# Patient Record
Sex: MALE | Race: WHITE | Employment: OTHER | URBAN - METROPOLITAN AREA
[De-identification: names, ages, dates, MRNs, and addresses within clinical notes are randomized per-mention and may not be internally consistent; named-entity substitution may affect disease eponyms.]

---

## 2017-05-31 ENCOUNTER — AMBULATORY - HEALTHEAST (OUTPATIENT)
Dept: NEUROSURGERY | Facility: CLINIC | Age: 62
End: 2017-05-31

## 2017-05-31 ENCOUNTER — RECORDS - HEALTHEAST (OUTPATIENT)
Dept: ADMINISTRATIVE | Facility: OTHER | Age: 62
End: 2017-05-31

## 2017-05-31 ENCOUNTER — OFFICE VISIT - HEALTHEAST (OUTPATIENT)
Dept: NEUROSURGERY | Facility: CLINIC | Age: 62
End: 2017-05-31

## 2017-05-31 DIAGNOSIS — R29.898 LEG WEAKNESS, BILATERAL: ICD-10-CM

## 2017-05-31 DIAGNOSIS — M51.16 LUMBAR DISC HERNIATION WITH RADICULOPATHY: ICD-10-CM

## 2017-05-31 ASSESSMENT — MIFFLIN-ST. JEOR
SCORE: 1791.44
SCORE: 1807.77

## 2017-06-01 ENCOUNTER — ANESTHESIA - HEALTHEAST (OUTPATIENT)
Dept: SURGERY | Facility: CLINIC | Age: 62
End: 2017-06-01

## 2017-06-01 ENCOUNTER — SURGERY - HEALTHEAST (OUTPATIENT)
Dept: SURGERY | Facility: CLINIC | Age: 62
End: 2017-06-01

## 2017-06-05 ENCOUNTER — COMMUNICATION - HEALTHEAST (OUTPATIENT)
Dept: NEUROSURGERY | Facility: CLINIC | Age: 62
End: 2017-06-05

## 2017-06-07 ENCOUNTER — COMMUNICATION - HEALTHEAST (OUTPATIENT)
Dept: NEUROSURGERY | Facility: CLINIC | Age: 62
End: 2017-06-07

## 2017-06-07 DIAGNOSIS — M54.9 BACK PAIN: ICD-10-CM

## 2017-06-15 ENCOUNTER — AMBULATORY - HEALTHEAST (OUTPATIENT)
Dept: NEUROSURGERY | Facility: CLINIC | Age: 62
End: 2017-06-15

## 2017-06-16 ENCOUNTER — AMBULATORY - HEALTHEAST (OUTPATIENT)
Dept: NEUROSURGERY | Facility: CLINIC | Age: 62
End: 2017-06-16

## 2017-06-16 DIAGNOSIS — Z48.02 REMOVAL OF STAPLES: ICD-10-CM

## 2017-06-20 ENCOUNTER — AMBULATORY - HEALTHEAST (OUTPATIENT)
Dept: NEUROSURGERY | Facility: CLINIC | Age: 62
End: 2017-06-20

## 2017-07-14 ENCOUNTER — OFFICE VISIT - HEALTHEAST (OUTPATIENT)
Dept: NEUROSURGERY | Facility: CLINIC | Age: 62
End: 2017-07-14

## 2017-07-14 DIAGNOSIS — M51.16 LUMBAR DISC HERNIATION WITH RADICULOPATHY: ICD-10-CM

## 2017-07-14 ASSESSMENT — MIFFLIN-ST. JEOR: SCORE: 1789.63

## 2017-12-18 ENCOUNTER — RECORDS - HEALTHEAST (OUTPATIENT)
Dept: ADMINISTRATIVE | Facility: OTHER | Age: 62
End: 2017-12-18

## 2017-12-20 ENCOUNTER — RECORDS - HEALTHEAST (OUTPATIENT)
Dept: RADIOLOGY | Facility: CLINIC | Age: 62
End: 2017-12-20

## 2017-12-20 ENCOUNTER — HOSPITAL ENCOUNTER (OUTPATIENT)
Dept: RADIOLOGY | Facility: CLINIC | Age: 62
Discharge: HOME | End: 2017-12-20
Attending: NEUROLOGICAL SURGERY

## 2017-12-20 ENCOUNTER — OFFICE VISIT - HEALTHEAST (OUTPATIENT)
Dept: NEUROSURGERY | Facility: CLINIC | Age: 62
End: 2017-12-20

## 2017-12-20 DIAGNOSIS — M54.9 BACK PAIN: ICD-10-CM

## 2017-12-20 ASSESSMENT — MIFFLIN-ST. JEOR: SCORE: 1789.63

## 2018-01-08 ENCOUNTER — COMMUNICATION - HEALTHEAST (OUTPATIENT)
Dept: NEUROSURGERY | Facility: CLINIC | Age: 63
End: 2018-01-08

## 2018-01-16 ENCOUNTER — COMMUNICATION - HEALTHEAST (OUTPATIENT)
Dept: NEUROSURGERY | Facility: CLINIC | Age: 63
End: 2018-01-16

## 2018-01-16 DIAGNOSIS — M54.9 BACK PAIN: ICD-10-CM

## 2018-01-17 ENCOUNTER — AMBULATORY - HEALTHEAST (OUTPATIENT)
Dept: NEUROSURGERY | Facility: CLINIC | Age: 63
End: 2018-01-17

## 2018-02-21 ENCOUNTER — HOSPITAL ENCOUNTER (OUTPATIENT)
Dept: SPEECH THERAPY | Facility: CLINIC | Age: 63
Setting detail: THERAPIES SERIES
End: 2018-02-21
Attending: PSYCHIATRY & NEUROLOGY
Payer: MEDICARE

## 2018-02-21 PROCEDURE — 92522 EVALUATE SPEECH PRODUCTION: CPT | Mod: GN | Performed by: SPEECH-LANGUAGE PATHOLOGIST

## 2018-02-21 PROCEDURE — G9175 SPEECH LANG GOAL STATUS: HCPCS | Mod: GN,CL | Performed by: SPEECH-LANGUAGE PATHOLOGIST

## 2018-02-21 PROCEDURE — G9174 SPEECH LANG CURRENT STATUS: HCPCS | Mod: GN,CK | Performed by: SPEECH-LANGUAGE PATHOLOGIST

## 2018-02-21 PROCEDURE — 92507 TX SP LANG VOICE COMM INDIV: CPT | Mod: GN | Performed by: SPEECH-LANGUAGE PATHOLOGIST

## 2018-02-21 PROCEDURE — 40000230 ZZH STATISTIC SPEECH FACIAL PARALYSIS VISIT: Performed by: SPEECH-LANGUAGE PATHOLOGIST

## 2018-02-28 NOTE — ADDENDUM NOTE
Encounter addended by: Christiane Chow SLP on: 2/27/2018  9:02 PM<BR>     Actions taken: Flowsheet accepted

## 2018-02-28 NOTE — PROGRESS NOTES
[facility]          OUTPATIENT SPEECH LANGUAGE PATHOLOGY FACIAL PARALYSIS EVALUATION  PLAN OF TREATMENT FOR OUTPATIENT REHABILITATION  (COMPLETE FOR INITIAL CLAIMS ONLY)  Patient's Last Name, First Name, M.I.  YOB: 1955  Haroon Villafana                        Provider s Name: [facility] Medical Record No.  [id number]     Onset Date: 01/26/18    Start of Care Date: 02/21/18   Type:     ___PT  ___OT   _X_SLP    Medical Diagnosis:  Speech Disturbance   Speech Language Pathology Diagnosis:  Dysarthria, Oral Phase Dysphagia (Informal assessment), Facial Weakness    Visits from SOC: 1    _________________________________________________________________________________  Plan of Treatment/Functional Goals:   Planned Therapy Interventions: Facial Therapy, Improve Facial Tone and Function, Improve Speech Intelligibilty           Goals   1. Goal Identifier: Oral Phase Swallowing       Goal Description: Request physician referral for Clinical Swallow Evaluation and complete/add treatment goals as indicated.         Target Date: 04/27/18   2. Goal Identifier: Speech       Goal Description: Patient will report a 25% improvement in ease of speech production.       Target Date: 04/27/18   3. Goal Identifier: Nonverbal Communication, Orofacial Function for Speech and Oral Stage Swallowing       Goal Description: Increase Facial Grading Score 10 points per therapist judegement reflecting gains in orofacial tone and movement for speech, nonverbal communication and oral phase swallowing.        Target Date: 04/27/18                           Christiane Chow, SLP       I CERTIFY THE NEED FOR THESE SERVICES FURNISHED UNDER        THIS PLAN OF TREATMENT AND WHILE UNDER MY CARE .             Physician Signature               Date    X_____________________________________________________             "            Certification Date From:   02/21/18  Certification Date To:  05/18/18             Referring Physician:  Janette Lance MD     Initial Assessment        See Epic Evaluation Start of Care Date: 02/21/18                     Speech Pathology/Facial Paralysis Evaluation - 02/21/18 1300   Visit Type   Visit Type Initial   Patient Type   Patient Type Adult   General Patient Information   Start Of Care Date 02/21/18   Referring Physician Janette Lance MD    Orders Eval And Treat   Orders Date 01/26/18   Medical Diagnosis Speech Disturbance   Onset Of Illness/injury Or Date Of Surgery 01/26/18   Surgical/Medical History Reviewed Yes  (Per patient, medical record unavailable at time of evaluation.)   Pertinent History Of Current Problem Per patient, \"I woke up almost a year ago, and the whole side of my face was drooping (4/3/17), went to the ER, they told me I had Bell's Palsy, went twice the face hurt so much, it also happened when I was 20 on the same (left) side and it went away completely. My eye finally started to blink a couple months later, I can't shave right, can't hold air in my cheek, my eye closes whenever I eat, it fonseca sometimes, if I put any food or drink on the left it shoots out the left side, so I pretty much chew on the right side all the time. I'm looking for exercises to do at home. My brother had this, too. \"  (Wife reports facial tone changes from day to day.)   General Health Diabetes;HBP;Surgery;Smoker  (Back pain)   Diagnostic Tests CT scan   Occupation Retired    Sensory Changes None   Pain Description None   Hearing Changes None   Eye Problems Excessive tearing   Oral Habits Unilateral chewing   Patient's Concerns tearing;difficulty drinking ;difficulty eating;other (comment)  (difficulty holding air in cheeks, food/liqid spill, tightnes)   Patient/Family Goals Improve above   Evaluation Results: Resting Tone and Symmetry/Oral status   Palpebral Fissure - Type of " Eye Tone  Narrow  (3 mm)   Nasolabial Fold  More Pronounced   Lips  - Type of Lip Tone  Normal   Chin  - Type of Chin Tone  [place]ingham   Type of Forehead Wrinkles Less   Type of Eye Wrinkles Less   Type of Cheek/Mouth Wrinkles More   Oral Rest Posture Inferior, dentalized   Evaluation Results: Forehead Elevation   Forehead Strength Rating % 20%   Forehead - Synkinesis  Orbicularis Occuli;Zygomaticus   Forehead General Severity of Synkinesis   mild   Evaluation Results: Minimal Effort Eye Closure    Complete Yes   Strength Rating % Reduced   Eye Closure Synkinesis   Levators;Zygomaticus;Platysma   General Severity of Synkinesis   (Slight)   Evaluation Results: Open Mouth Smile    Open Smile Strength Rating % 25%   Open Smile Synkinesis   Orbicularis Occuli;Mentalis   Open Smile General Severity of Synkinesis   mild   Evaluation Results: Closed Mouth Smile    Closed Mouth Smile Strength Rating % 50%   Closed Mouth Smile Synkinesis   Orbicularis Occuli;Mentalis   Closed Mouth Smile General Severity of Synkinesis   mild   Evaluation Results: Snarl   Snarl Strength Rating % 70%   Snarl Synkinesis  Orbicularis Occuli;Zygomaticus;Mentalis;Platysma   Snarl General Severity of Synkinesis   severe    Evaluation Results: Pucker   Strength Rating % 40%   Pucker Synkinesis   Orbicularis Occuli;Zygomaticus;Mentalis;Platysma   General Severity of Synkinesis   moderate to severe   Evaluation Results: Compression   Strength Rating % 50%   Compression Synkinesis   Orbicularis Occuli   General Severity of Synkinesis   (Slight)   Evaluation Results: Contraction   Strength Rating % 75%   Contraction Synkinesis   Orbicularis Occuli;Zygomaticus;Mentalis;Platysma   General Severity of Synkinesis   moderate to severe   Evaluation Results: Protrusion   Strength Rating % 40%   Protrusion Synkinesis   Orbicularis Occuli;Zygomaticus;Mentalis   General Severity of Synkinesis   moderate   Evaluation Results: Pout   Strength Rating % 60%   Pout  Synkinesis   Orbicularis Occuli;Zygomaticus;Mentalis   General Severity of Synkinesis   moderate to severe   Evaluation Results: Lower Lip Depression   Strength Rating % 80%   Lower Lip Depression Synkinesis   Orbicularis Occuli;Zygomaticus;Mentalis   General Severity of Synkinesis   moderate to severe   Evaluation Results: Tongue Movement   Tongue Protrusion WNL   Elevation/Depression Extraorally (WNL)   Elevation/Depression Intraorally Deviates to left on elevation   Presence of Synkinesis with Lingual Movements Orbicularis oculi - moderate to severel   Evaluation Results: Speech Function   Evaluation Results: Speech Function Reduced stability necessary for refined speech;Deviation of lips during speech to right, stronger side;Reduced lip movement on the left;Impaired labial sounds, (e.g. p, b, m, f, v)   Synkinesis with Sound-Lip Rounding Orbicularis Occuli   General Severity of Synkinesis with Sound-Lip Rounding Mild to moderate   Synkinesis with Sound-Lip Pressure Orbicularis Occuli;Zygomaticus   General Severity of Synkinesis with Sound-Lip Pressure mild to moderate   General Therapy Interventions   Planned Therapy Interventions Facial Therapy;Improve Facial Tone and Function;Improve Speech Intelligibilty   Clinical Impressions   Criteria for Skilled Therapeutic Interventions Met yes;treatment indicated   Facial Grading Score 42.75/100   Communication Diagnosis Dysarthria   Swallowing Diagnosis Oral Phase Dysphagia  (Informal assessment)   Rehab Potential good to achieve stated therapy goal(s)   Therapy Frequency  (1 x/1-2 months)   Predicted Duration of Therapy Intervention (days/weeks) 12 months   Risks and Benefits of Treatment have been explained yes   Patient, family and/or staff in agreement yes   Facial Paralysis Goals   Facial Paralysis Goals 1;2;3   Facial Paralysis Goal 1   Goal Identifier Oral Phase Swallowing   Goal Description Request physician referral for Clinical Swallow Evaluation and  complete/add treatment goals as indicated.     Target Date 04/27/18   Facial Paralysis Goal 2   Goal Identifier Speech   Goal Description Patient will report a 25% improvement in ease of speech production.   Target Date 04/27/18   Facial Paralysis Goal 3   Goal Identifier Nonverbal Communication, Orofacial Function for Speech and Oral Stage Swallowing   Goal Description Increase Facial Grading Score 10 points per therapist judegement reflecting gains in orofacial tone and movement for speech, nonverbal communication and oral phase swallowing.    Target Date 04/27/18   Education Assessment   Preferred Learning Style Listening;Reading;Demonstration;Pictures/video   Total Evaluation Time   Total Evaluation Time 45

## 2018-02-28 NOTE — ADDENDUM NOTE
Encounter addended by: Christiane Chow, SLP on: 2/27/2018  8:56 PM<BR>     Actions taken: Sign clinical note, Flowsheet accepted, Pend clinical note, Document created

## 2018-02-28 NOTE — ADDENDUM NOTE
Encounter addended by: Christiane Chow SLP on: 2/28/2018  5:12 PM<BR>     Actions taken: Sign clinical note

## 2018-03-19 NOTE — ADDENDUM NOTE
Encounter addended by: Christiane Chow, SLP on: 3/19/2018  4:21 PM<BR>     Actions taken: Flowsheet accepted, Charge Capture section accepted

## 2019-01-10 ENCOUNTER — RECORDS - HEALTHEAST (OUTPATIENT)
Dept: ADMINISTRATIVE | Facility: OTHER | Age: 64
End: 2019-01-10

## 2019-02-08 ENCOUNTER — RECORDS - HEALTHEAST (OUTPATIENT)
Dept: ADMINISTRATIVE | Facility: OTHER | Age: 64
End: 2019-02-08

## 2019-02-21 ENCOUNTER — RECORDS - HEALTHEAST (OUTPATIENT)
Dept: ADMINISTRATIVE | Facility: OTHER | Age: 64
End: 2019-02-21

## 2019-03-08 ENCOUNTER — RECORDS - HEALTHEAST (OUTPATIENT)
Dept: ADMINISTRATIVE | Facility: OTHER | Age: 64
End: 2019-03-08

## 2019-03-13 ENCOUNTER — RECORDS - HEALTHEAST (OUTPATIENT)
Dept: ADMINISTRATIVE | Facility: OTHER | Age: 64
End: 2019-03-13

## 2019-04-12 ENCOUNTER — RECORDS - HEALTHEAST (OUTPATIENT)
Dept: ADMINISTRATIVE | Facility: OTHER | Age: 64
End: 2019-04-12

## 2019-04-15 ENCOUNTER — RECORDS - HEALTHEAST (OUTPATIENT)
Dept: ADMINISTRATIVE | Facility: OTHER | Age: 64
End: 2019-04-15

## 2019-04-30 ENCOUNTER — RECORDS - HEALTHEAST (OUTPATIENT)
Dept: ADMINISTRATIVE | Facility: OTHER | Age: 64
End: 2019-04-30

## 2019-05-15 ENCOUNTER — RECORDS - HEALTHEAST (OUTPATIENT)
Dept: ADMINISTRATIVE | Facility: OTHER | Age: 64
End: 2019-05-15

## 2019-06-19 ENCOUNTER — RECORDS - HEALTHEAST (OUTPATIENT)
Dept: ADMINISTRATIVE | Facility: OTHER | Age: 64
End: 2019-06-19

## 2019-10-14 ENCOUNTER — RECORDS - HEALTHEAST (OUTPATIENT)
Dept: ADMINISTRATIVE | Facility: OTHER | Age: 64
End: 2019-10-14

## 2019-11-13 ENCOUNTER — AMBULATORY - HEALTHEAST (OUTPATIENT)
Dept: NEUROSURGERY | Facility: CLINIC | Age: 64
End: 2019-11-13

## 2019-11-13 DIAGNOSIS — M54.16 LUMBAR RADICULOPATHY: ICD-10-CM

## 2019-11-25 ENCOUNTER — RECORDS - HEALTHEAST (OUTPATIENT)
Dept: ADMINISTRATIVE | Facility: OTHER | Age: 64
End: 2019-11-25

## 2019-12-02 ENCOUNTER — RECORDS - HEALTHEAST (OUTPATIENT)
Dept: ADMINISTRATIVE | Facility: OTHER | Age: 64
End: 2019-12-02

## 2019-12-02 ENCOUNTER — COMMUNICATION - HEALTHEAST (OUTPATIENT)
Dept: NEUROSURGERY | Facility: CLINIC | Age: 64
End: 2019-12-02

## 2019-12-09 ENCOUNTER — OFFICE VISIT - HEALTHEAST (OUTPATIENT)
Dept: NEUROSURGERY | Facility: CLINIC | Age: 64
End: 2019-12-09

## 2019-12-09 DIAGNOSIS — R52 PAIN: ICD-10-CM

## 2019-12-09 ASSESSMENT — MIFFLIN-ST. JEOR: SCORE: 1724.32

## 2021-05-31 VITALS — HEIGHT: 68 IN | WEIGHT: 226 LBS | BODY MASS INDEX: 34.25 KG/M2

## 2021-05-31 VITALS — BODY MASS INDEX: 34.86 KG/M2 | WEIGHT: 230 LBS | HEIGHT: 68 IN

## 2021-05-31 VITALS — HEIGHT: 68 IN | BODY MASS INDEX: 34.25 KG/M2 | WEIGHT: 226 LBS

## 2021-05-31 VITALS — WEIGHT: 226.4 LBS | BODY MASS INDEX: 34.31 KG/M2 | HEIGHT: 68 IN | BODY MASS INDEX: 34.42 KG/M2 | WEIGHT: 226.4 LBS

## 2021-05-31 VITALS — HEIGHT: 68 IN | BODY MASS INDEX: 34.97 KG/M2

## 2021-06-03 NOTE — TELEPHONE ENCOUNTER
Called patient to inform him to bring a disk of his imaging as I was unable to push to our  per [facility] as it was not done with them.

## 2021-06-04 VITALS
OXYGEN SATURATION: 93 % | BODY MASS INDEX: 30.82 KG/M2 | HEART RATE: 60 BPM | WEIGHT: 208.1 LBS | SYSTOLIC BLOOD PRESSURE: 183 MMHG | HEIGHT: 69 IN | DIASTOLIC BLOOD PRESSURE: 91 MMHG

## 2021-06-04 NOTE — PROGRESS NOTES
The patient is a 64-year-old male.  He had previous back surgery by Dr. Arroyo.  He now complains of back pain.  No radicular pain.  I do not see anything particularly bad on MRI.  I showed him the MRI pictures.  He does have some neural foramen narrowing but no radicular pain.  He does have some bulging disks, not bad.  He does have degenerative changes.  I mentioned the possibility of a fusion to him.  He would need to stop smoking.  He says that is not going to happen.  Plan a consult at the spine center to go over nonsurgical treatment.  Total time 20 minutes, more than 50% spent counseling and/or coordinating care.

## 2021-06-04 NOTE — PROGRESS NOTES
Neurosurgery consultation was requested by: Former Dr. Arroyo pt.   Pain: Middle of low back pain, constant   Radicular Pain is present: Denies radicular pain   Lhermitte sign: no   Motor complaints: Denies weakness  Sensory complaints: Denies numbness and tingling  Gait and balance issues: no   Bowel or bladder issues: no  Duration of SX is: 2 and half years   The symptoms are worse with: sitting and walking   The symptoms are better with: nothing  Injury: Denies   Severity is: chronic   Patient has tried the following conservative measures: He has done injections and PT in the past and did not help.   Pt states pain started after a  L1-2 and L3-4 decompressive laminectomy on 6/1/17 by Dr. Arroyo.   CAROLYN score is:  62%  ARIANA Salmon

## 2021-06-11 NOTE — ANESTHESIA POSTPROCEDURE EVALUATION
Patient: Haroon Villafana  L1-2 and L3-4 decompressive laminectomy  Anesthesia type: general    Patient location: PACU  Last vitals:   Vitals:    06/01/17 1515   BP: 139/69   Pulse: 66   Resp: 12   Temp:    SpO2: 98%     Post vital signs: stable  Level of consciousness: awake and responds to simple questions  Post-anesthesia pain: pain controlled  Post-anesthesia nausea and vomiting: no  Pulmonary: unassisted, return to baseline  Cardiovascular: stable and blood pressure at baseline  Hydration: adequate  Anesthetic events: no    QCDR Measures:  ASA# 11 - Nicky-op Cardiac Arrest: ASA11B - Patient did NOT experience unanticipated cardiac arrest  ASA# 12 - Nicky-op Mortality Rate: ASA12B - Patient did NOT die  ASA# 13 - PACU Re-Intubation Rate: ASA13B - Patient did NOT require a new airway mgmt  ASA# 10 - Composite Anes Safety: ASA10A - No serious adverse event  ASA# 38 - New Corneal Injury: ASA38A - No new exposure keratitis or corneal abrasion in PACU    Additional Notes:

## 2021-06-11 NOTE — PROGRESS NOTES
NEUROSURGERY CONSULTATION NOTE    Haroon Villafana   [id number] [address] [facility] [id number]  61 y.o. male is sent us in consultation  From [facility] ED for c/o back and BLE pain with associated BLE weakness.     CONSULTATION ASSESSMENT AND PLAN:  L12  Mod stenosis, L34 disk herniation with severe central spinal canal stenosis, BLE weakness.  Admit to [facility] and plan to OR tomorrow for decompressive laminectomy by Dr. Arroyo. She will try to see patient this evening. Admitting MD,  Dr. Patel, much appreciated.        ADDITIONAL COMMENTS:  I discussed the patient's exam and imaging with Dr. Arroyo.      HPI:  Presented to  ED this am for c/o  acute onset severe low back pain that began 4-5 days ago (see visit note). He denies any acute injury, no recall of precipitating event.  Pain starts mid low back and radiates  into both legs posteriorly down to the ankles.   Pain is so severe at times that he has fallen to ground.  Describes pain as 80% legs, 20% back.  Reports tingling in both legs in same distribution, w/ bilateral leg weakness. Pain is better with sitting, worse with standing or walking. Bending forward takes some of the pressure off of his legs.   He has had no change in bowel or bladder symptoms, no incontinence. Has had similar episodes in past year but not this severe.  Diagnosed with Bell's Palsy in April, L facial weakness persists.  On ASA, was taking Ibuprofen but not for past 2 weeks.  Given Toradol in ED this am.  ED consulted with neurosurgery and we recommended transfer to [facility] for urgent surgery but patient refused as he had to transport his RV back to his home in [place], [place]. Agreed to return for afternoon clinic appt.       PROBLEM LIST:  Patient Active Problem List    Diagnosis Date Noted     Complete Right Bundle Branch Block      Type 2 Diabetes Mellitus      Hyperlipidemia      Major Depression, Single Episode      Peripheral Neuropathy      Hypertension  "     Lumbosacral Root Lesions      Massive Tear Of Rotator Cuff Tendon      Chronic Pain Syndrome      Joint Pain, Localized In The Shoulder      Bulging Cervical Disc      Bulging Lumbar Disc      Cervicalgia      Lumbar Radiculopathy      Insomnia         No past medical history on file.    Past Surgical History:   Procedure Laterality Date     IA LAP,INGUINAL HERNIA REPR,INITIAL      Description: Laparoscopy Repair Of Initial Inguinal Hernia;  Recorded: 07/13/2011;       REVIEW OF SYSTEMS:  12 point review of symptoms:  Bell's palsy dx April 2017--short course steroids, DM controlled on insulin, otherwise neg xc as in  HPI.       MEDICATIONS:  No current outpatient prescriptions on file.     No current facility-administered medications for this visit.        ALLERGIES/SENSITIVITIES:     No Known Allergies    PERTINENT SOCIAL HISTORY:   Social History     Social History     Marital status:      Spouse name: N/A     Number of children: N/A     Years of education: N/A     Social History Main Topics     Smoking status: Not on file     Smokeless tobacco: Not on file     Alcohol use Not on file     Drug use: Not on file     Sexual activity: Not on file     Other Topics Concern     Not on file     Social History Narrative     No narrative on file         FAMILY HISTORY:  No family history on file.     VS:    /72  Pulse 68  Resp 16  Ht 5' 8\" (1.727 m)  Wt (!) 230 lb (104.3 kg)  BMI 34.97 kg/m2    PHYSICAL EXAM:   Constitutional: Obese, Odor of cigarettes     Mental Status: A & O in mild distress.  Affect is appropriate.  Speech is fluent.  Recent and remote memory are intact.  Attention span and concentration are normal.     Cranial Nerves: CN1: grossly intact per patient recall. CN2: No funduscopic exam performed. CN3,4 & 6: Pupillary light response, lateral and vertical gaze normal.  No nystagmus.  Visual fields are full to confrontation. CN5: Intact to touch CN7:L facial weakness, L facial droop, " Difficulty closing L eye (Bell's Palsy)  CN8: Intact to spoken voice. CN9&10: Gag reflex, uvula midline, palate rises with phonation. CN11: Shoulder shrug 5/5 intact bilaterally. CN12: Tongue midline and moves freely from side to side.     Motor: No pronator drift of upper extremity. Normal bulk and tone all muscle groups of upper extremities. Lower extremities with normal bulk and tone but cannot heel/toe walk or do any maneuvers due to severe pain.     Strength:   Arm strength bilateral grasp, biceps, triceps, and deltoids 5/5   Leg strength bilateral dorsiflexion, plantar flexion, and hip flexion 2-3/5      Sensory: Sensation intact bilaterally to light touch and pin prick     Coordination; finger to nose,  smooth and rhythmic. Romberg intact. Heel/toe/tandem gait not attempted.  Gait and station severely antalgic and unsteady.      Reflexes; supinator, biceps, triceps, knee/ ankle jerk intact. No hoffmans/babinski/ clonus--Hyporeflexic, DM.     SLR positive bilaterally at about 30 degrees.     IMAGING:     MRI: 1. There is transitional anatomy at the lumbosacral junction. This report considers L5 the transitional body that is partially sacralized on the left. Careful correlation between the MRI and radiographs required to ensure proper level localization prior to surgery or any other intervention. 2.  Numbering in this manner there is a disc herniation at L3-L4 that contributes to relatively severe central spinal canal stenosis. Disc osteophyte complex and disc bulge contact the exiting nerve on the left and right respectively. 3.  At L1-L2 disc bulge contributes to moderate to severe spinal canal stenosis. 4.  At L4-L5 there is severe right and moderate left facet arthropathy and mild disc degeneration that results in mild lateral recess and at least moderate right worse than left bilateral foraminal stenoses. 5.  A 4.2 cm infrarenal abdominal aortic aneurysm.

## 2021-06-11 NOTE — ANESTHESIA PREPROCEDURE EVALUATION
Anesthesia Evaluation      Patient summary reviewed   No history of anesthetic complications     Airway   Mallampati: II  Neck ROM: full   Pulmonary - normal exam    breath sounds clear to auscultation  (+) a smoker  (-) COPD, asthma, sleep apnea                         Cardiovascular   Exercise tolerance: > or = 4 METS (Prior to back pain)  (+) hypertension, dysrhythmias, , hypercholesterolemia,     (-) murmur  ECG reviewed (RBBB)  Rhythm: regular  Rate: normal,    no murmur      Neuro/Psych    (+) neuromuscular disease,  depression, chronic pain    Comments: Walford Palsy    Endo/Other    (+) diabetes mellitus type 2 using insulin, obesity,   (-) hypothyroidism     GI/Hepatic/Renal - negative ROS   (-) GERD     Other findings: Few remaining teeth on bottom intact      Dental    (+) poor dentition                       Anesthesia Plan  Planned anesthetic: general endotracheal    ASA 3   Induction: intravenous   Anesthetic plan and risks discussed with: patient and spouse  Anesthesia plan special considerations: antiemetics,   Post-op plan: routine recovery

## 2021-06-11 NOTE — ANESTHESIA CARE TRANSFER NOTE
Last vitals:   Vitals:    06/01/17 1455   BP: 126/71   Pulse: 68   Resp: 12   Temp: 36.5  C (97.7  F)   SpO2: 100%     Patient's level of consciousness is drowsy  Spontaneous respirations: yes  Maintains airway independently: yes  Dentition unchanged: yes  Oropharynx: oropharynx clear of all foreign objects    QCDR Measures:  ASA# 20 - Surgical Safety Checklist: ASA20A - Safety Checks Done  PQRS# 430 - Adult PONV Prevention: 4558F - Pt received => 2 anti-emetic agents (different classes) preop & intraop  ASA# 8 - Peds PONV Prevention: NA - Not pediatric patient, not GA or 2 or more risk factors NOT present  PQRS# 424 - Nicky-op Temp Management: 4559F - At least one body temp DOCUMENTED => 35.5C or 95.9F within required timeframe  PQRS# 426 - PACU Transfer Protocol: - Transfer of care checklist used  ASA# 14 - Acute Post-op Pain: Patient did not experience pain >7  Breathing spontaneously with adequate tidal volume, patient opens eyes to name, oral pharynx suctioned, and ETT removed with good air exchange.

## 2021-06-11 NOTE — PROGRESS NOTES
CHART NOTE     DATE OF SERVICE:   2017    : 1955   62 y.o.     ASSESSMENT :   Doing well with improvement in symptoms and function.       PLAN:  Loosen restrictions. Refer to PT. RTC prn. Enc to call with any new questions or concerns.     HPI:  Haroon Villafana is status post L1- and L3-4 decompressive lumbar laminectomy, medial facetectomy, foramenotomy on 2017 by Dr. Arroyo. Preoperatively presented with bilateral leg weakness, numbness and pain.     TODAY, Haroon Villafana reports leg pain is gone. Still feels like he has a knot in his back.  Walking improved.  No residual N/T.  Retired.       PAST MEDICAL HISTORY, SURGICAL HISTORY, REVIEW OF SYMPTOMS, MEDICATIONS AND ALLERGIES:  Past medical history, surgical history, review of symptoms, medications and allergies remain unchanged.    Past Medical History:   Diagnosis Date     Diabetes mellitus      Essential hypertension      Hyperlipidemia      Left-sided Bell's palsy      Lumbar spinal stenosis        PHYSICAL EXAM:      Neurological exam reveals:  Recent and remote memory intact, fund of knowledge wnl.    Alert and oriented x3, speech fluent and appropriate.   PERRL, EOMI, No nystagmus, Face symmetric, tongue midline, Shoulder shrug equal  Leg strength bilateral dorsiflexion, plantar flexion, and hip flexion 5/5  Can heel/toe walk, do toe rises and squats without difficulty.   Muscle Bulk and tone wnl.   Reflexes: No pathological reflexes   Gait and station:Normal  Incision: CDI without erythema or edema  NDI/CAROLYN: 62%     RADIOGRAPHIC IMAGING:  NA

## 2021-06-11 NOTE — PROGRESS NOTES
"Haroon Villafana is status post L1- and L3-4 decompressive lumbar laminectomy, medial facetectomy, foramenotomy on 6/1/2017 by Dr. Arroyo.  Preoperatively presented with bilateral leg weakness, numbness and pain.  Today he returns in follow up for staples removal. He is doing reasonably well  - reports a \"stiff\" back. Denies radicular pain, sensory or motor issues in LE. Takes Advil prn. Gait and balance are normal.      Surgical wound WNL - CDI, no signs of infection or skin breakdown.  Incision well-healed: good skin approximation, no redness or visible/palpable edema, no tenderness to palpation.  PT. AF, denies fever, chills or sweats.  Pt. reports that the symptoms are improved from pre-op.    Staples - intact removed without difficulty. Wound prepped with Betadine before and after removal.  Surrounding skin has no signs of breakdown.  Verbal instructions regarding incision care are given.  Pt. advised to call us if any s/s of infection noted - all discussed in details.        "

## 2021-06-14 NOTE — PROGRESS NOTES
"NEUROSURGERY FOLLOWUP  NOTE    Haroon Villafana comes today wellknown to us s/p L1L2  and L34 decompressive laminectomy for claudication 6/2017 .  Legs good.    But  His back now hurts on the R of midline going up the spinal muscles, worse with sitting hard to get upright (uses his arms to walk up his thighs to get upright) .  Pain is constant.  Worse with getting out of car, first rolling out of bed.  Hard getting upstairs.         Wants something for pain       The pts PMH, PSH, ROS, Meds, Allergies, SH, FH are all unchanged and summarized in the pts health history from last visit        PHYSICAL EXAM:   Constitutional: BP (!) 189/95  Pulse (!) 57  Ht 5' 8\" (1.727 m)  Wt (!) 226 lb (102.5 kg)  SpO2 95%  BMI 34.36 kg/m2     Mental Status: A & O in no acute distress.  Affect is appropriate.  Speech is fluent.  Recent and remote memory are intact.  Attention span and concentration are normal.     Cranial Nerves: CN1: grossly intact per patient recall. CN2: No funduscopic exam performed. CN3,4 & 6: Pupillary light response, lateral and vertical gaze normal.  No nystagmus.  Visual fields are full to confrontation. CN5: Intact to touch CN7: No facial weakness, smile, facial symmetry intact. CN8: Intact to spoken voice. CN9&10: Gag reflex, uvula midline, palate rises with phonation. CN11: Shoulder shrug 5/5 intact bilaterally. CN12: Tongue midline and moves freely from side to side.     Motor: No pronator drift of upper extremity. Normal bulk and tone all muscle groups of upper and lower extremities.     Sensory: Sensation intact bilaterally to light touch.       Coordination:   Heel/toe/ gait intact.   tandem gait ok      Reflexes; supinator, biceps, triceps, knee/ ankle jerk intact  No  hoffmans/   No  babinski/ clonus.    Painful going from sitting to standing and sitting to laying    Pt with back  pain with internal > external rotation of hips    IMAGING:   I personally reviewed all radiographic " images     MRI with transitional level;  decompressed canal;   Disk height good, endplates good.  Facet arthopathy scattered  Anterior osteophytes at L34;  Edema in R pedicle at L4 and L5 with some endplate changes on T1 post shi.      CONSULTATION ASSESSMENT AND PLAN:  Pt with mechanical R muscular pain about 4 cm from midline and up the length of lumbar spine worse with changes of position and tender to touch.     I will send him for flex/ext lateral xrays and Bonescan with CT SPECT to look for target for injection  And diagnosis of underlying cause.  He will start PT while we are waiting for the w/u and we will chart check results to make sure injections ordered appropriate before next apt.   If bone scan clean would inject L45 LISBETH.         I spent more than 45 minutes in this apt, examining the pt, reviewing the scans, reviewing notes from chart, discussing treatment options with risks and benefits and coordinating care. >50 % clinic time was spent in face to face counseling and coordinating care    Destinee Arroyo      CC:     Irvin Boland MD   [facility]  [address] [place] [id number]

## 2021-06-15 PROBLEM — R29.898 LEG WEAKNESS, BILATERAL: Status: ACTIVE | Noted: 2017-05-31

## 2021-06-15 PROBLEM — G95.20: Status: ACTIVE | Noted: 2017-05-31

## 2021-06-15 PROBLEM — M51.16 LUMBAR DISC HERNIATION WITH RADICULOPATHY: Status: ACTIVE | Noted: 2017-05-31

## 2021-07-03 NOTE — ADDENDUM NOTE
Addendum Note by Nesha Medina RN at 6/7/2017  1:00 PM     Author: Nesha Medina RN Service: -- Author Type: Registered Nurse    Filed: 6/7/2017  1:00 PM Encounter Date: 6/7/2017 Status: Signed    : Nesha Medina RN (Registered Nurse)    Addended by: NESHA MEDINA on: 6/7/2017 01:00 PM        Modules accepted: Orders

## 2021-07-03 NOTE — ADDENDUM NOTE
Addendum Note by Nesha Medina RN at 1/17/2018  9:42 AM     Author: Nesha Medina RN Service: -- Author Type: Registered Nurse    Filed: 1/17/2018  9:42 AM Encounter Date: 1/16/2018 Status: Signed    : Nesha Medina RN (Registered Nurse)    Addended by: NESHA MEDINA on: 1/17/2018 09:42 AM        Modules accepted: Orders

## 2024-06-06 ENCOUNTER — TRANSFERRED RECORDS (OUTPATIENT)
Dept: HEALTH INFORMATION MANAGEMENT | Facility: CLINIC | Age: 69
End: 2024-06-06
Payer: COMMERCIAL

## 2024-06-06 DIAGNOSIS — T45.1X5A ANTINEOPLASTIC CHEMOTHERAPY INDUCED PANCYTOPENIA (H): Primary | ICD-10-CM

## 2024-06-06 DIAGNOSIS — D61.810 ANTINEOPLASTIC CHEMOTHERAPY INDUCED PANCYTOPENIA (H): Primary | ICD-10-CM

## 2024-06-06 PROBLEM — D64.9 ANEMIA: Status: ACTIVE | Noted: 2024-06-06

## 2024-06-06 RX ORDER — EPINEPHRINE 1 MG/ML
0.3 INJECTION, SOLUTION, CONCENTRATE INTRAVENOUS EVERY 5 MIN PRN
OUTPATIENT
Start: 2024-06-06

## 2024-06-06 RX ORDER — HEPARIN SODIUM (PORCINE) LOCK FLUSH IV SOLN 100 UNIT/ML 100 UNIT/ML
5 SOLUTION INTRAVENOUS
OUTPATIENT
Start: 2024-06-06

## 2024-06-06 RX ORDER — HEPARIN SODIUM,PORCINE 10 UNIT/ML
5-20 VIAL (ML) INTRAVENOUS DAILY PRN
OUTPATIENT
Start: 2024-06-06

## 2024-06-06 RX ORDER — DIPHENHYDRAMINE HYDROCHLORIDE 50 MG/ML
50 INJECTION INTRAMUSCULAR; INTRAVENOUS
Start: 2024-06-06